# Patient Record
Sex: MALE | Race: WHITE | NOT HISPANIC OR LATINO | Employment: UNEMPLOYED | URBAN - METROPOLITAN AREA
[De-identification: names, ages, dates, MRNs, and addresses within clinical notes are randomized per-mention and may not be internally consistent; named-entity substitution may affect disease eponyms.]

---

## 2024-11-13 ENCOUNTER — OFFICE VISIT (OUTPATIENT)
Age: 39
End: 2024-11-13
Payer: MEDICAID

## 2024-11-13 VITALS
BODY MASS INDEX: 19.04 KG/M2 | SYSTOLIC BLOOD PRESSURE: 110 MMHG | WEIGHT: 133 LBS | DIASTOLIC BLOOD PRESSURE: 80 MMHG | HEART RATE: 63 BPM | HEIGHT: 70 IN | OXYGEN SATURATION: 97 %

## 2024-11-13 DIAGNOSIS — F41.1 GAD (GENERALIZED ANXIETY DISORDER): ICD-10-CM

## 2024-11-13 DIAGNOSIS — G47.00 INSOMNIA, UNSPECIFIED TYPE: ICD-10-CM

## 2024-11-13 DIAGNOSIS — F33.1 MAJOR DEPRESSIVE DISORDER, RECURRENT, MODERATE: Primary | ICD-10-CM

## 2024-11-13 PROCEDURE — 93000 ELECTROCARDIOGRAM COMPLETE: CPT | Performed by: NURSE PRACTITIONER

## 2024-11-13 PROCEDURE — 99204 OFFICE O/P NEW MOD 45 MIN: CPT | Performed by: NURSE PRACTITIONER

## 2024-11-13 RX ORDER — ESCITALOPRAM OXALATE 5 MG/1
5 TABLET ORAL DAILY
Qty: 14 TABLET | Refills: 0 | Status: CANCELLED | OUTPATIENT
Start: 2024-11-13

## 2024-11-13 RX ORDER — BUPROPION HYDROCHLORIDE 150 MG/1
1 TABLET ORAL DAILY
COMMUNITY
Start: 2024-08-28 | End: 2024-11-13 | Stop reason: SDUPTHER

## 2024-11-13 RX ORDER — ESCITALOPRAM OXALATE 10 MG/1
1 TABLET ORAL DAILY
COMMUNITY
Start: 2024-08-28 | End: 2024-11-13

## 2024-11-13 RX ORDER — METHADONE HYDROCHLORIDE 10 MG/1
65 TABLET ORAL DAILY
COMMUNITY

## 2024-11-13 RX ORDER — BUPROPION HYDROCHLORIDE 150 MG/1
150 TABLET ORAL DAILY
Qty: 90 TABLET | Refills: 0 | Status: SHIPPED | OUTPATIENT
Start: 2024-11-13

## 2024-11-13 RX ORDER — TRAZODONE HYDROCHLORIDE 50 MG/1
50 TABLET, FILM COATED ORAL NIGHTLY
COMMUNITY

## 2024-11-13 RX ORDER — TRAZODONE HYDROCHLORIDE 50 MG/1
50 TABLET, FILM COATED ORAL NIGHTLY
Qty: 30 TABLET | Refills: 0 | Status: CANCELLED | OUTPATIENT
Start: 2024-11-13

## 2024-11-13 NOTE — PROGRESS NOTES
"Chief Complaint  Med Refill    Subjective        Umair Hughes presents to [facility]  History of Present Illness    History of Present Illness  The patient presents for evaluation of medication refill.    He has exhausted his supply of medications. His methadone clinic advised him to undergo an EKG. He has been on Lexapro for approximately 4 to 5 months and methadone for about 3 to 4 weeks. He is considering discontinuing Lexapro, which he uses to manage his depression and anxiety. He has been without Lexapro for the past 2 days. He also takes trazodone for sleep, but not consistently every night. He has been on Wellbutrin 150 mg for about 8 months and reports no issues with this medication.    Results         Objective   Vital Signs:  /80   Pulse 63   Ht 177.8 cm (70\")   Wt 60.3 kg (133 lb)   SpO2 97%   BMI 19.08 kg/m²   Estimated body mass index is 19.08 kg/m² as calculated from the following:    Height as of this encounter: 177.8 cm (70\").    Weight as of this encounter: 60.3 kg (133 lb).    BMI is within normal parameters. No other follow-up for BMI required.      Physical Exam  Vitals reviewed.   Constitutional:       Appearance: Normal appearance.   HENT:      Nose: Nose normal.      Mouth/Throat:      Mouth: Mucous membranes are moist.      Pharynx: Oropharynx is clear.   Eyes:      Conjunctiva/sclera: Conjunctivae normal.   Cardiovascular:      Rate and Rhythm: Normal rate and regular rhythm.      Heart sounds: Normal heart sounds.   Pulmonary:      Effort: Pulmonary effort is normal.      Breath sounds: Normal breath sounds.   Musculoskeletal:         General: Normal range of motion.      Cervical back: Normal range of motion.   Skin:     General: Skin is warm.   Neurological:      Mental Status: He is alert and oriented to person, place, and time.   Psychiatric:         Mood and Affect: Mood normal.         Behavior: Behavior normal.         Thought Content: " Thought content normal.        Result Review :           ECG 12 Lead    Date/Time: 11/13/2024 1:45 PM  Performed by: Temitope Paiz APRN    Authorized by: Temitope Paiz APRN  Comparison: not compared with previous ECG   Previous ECG: no previous ECG available  Rhythm: sinus rhythm  Other findings: prolonged QTc interval              Assessment and Plan   Diagnoses and all orders for this visit:    1. Major depressive disorder, recurrent, moderate (Primary)  -     ECG 12 Lead  -     buPROPion XL (WELLBUTRIN XL) 150 MG 24 hr tablet; Take 1 tablet by mouth Daily.  Dispense: 90 tablet; Refill: 0    2. LISS (generalized anxiety disorder)  -     ECG 12 Lead  -     buPROPion XL (WELLBUTRIN XL) 150 MG 24 hr tablet; Take 1 tablet by mouth Daily.  Dispense: 90 tablet; Refill: 0    3. Insomnia, unspecified type        Assessment & Plan  1. Depression.  The patient has been on Lexapro for 4-5 months but reports not noticing any benefit. Due to potential heart arrhythmias when combined with methadone, the Lexapro dosage will be discontinued. An EKG has been ordered to monitor heart health. Alternative treatment options or referral to behavioral health will be considered if necessary.    2. Anxiety.  The patient has been using Lexapro for anxiety management.     3. Insomnia.  The patient uses trazodone to aid sleep but does not take it every night. He would like to discontinue trazodone.    4. Opioid Dependence.  The patient has been on methadone for 3-4 weeks. The interaction between methadone and Lexapro necessitates the discontinuation of Lexapro.    5. Medication Management.  The patient has been on Wellbutrin 150 mg for about 8 months and reports doing well on it. Continuation of Wellbutrin is recommended.         The following portions of the patient's history were reviewed and updated as appropriate: allergies, current medications, past family history, past medical history, past social history, past surgical history,  and problem list.       Follow Up   Return in about 2 weeks (around 11/27/2024) for Annual.  Patient was given instructions and counseling regarding his condition or for health maintenance advice. Please see specific information pulled into the AVS if appropriate.         Patient or patient representative verbalized consent for the use of Ambient Listening during the visit with  EDVIN Reid for chart documentation. 11/13/2024  14:37 EST

## 2024-12-26 ENCOUNTER — OFFICE VISIT (OUTPATIENT)
Age: 39
End: 2024-12-26
Payer: MEDICAID

## 2024-12-26 VITALS
OXYGEN SATURATION: 99 % | WEIGHT: 153.8 LBS | DIASTOLIC BLOOD PRESSURE: 62 MMHG | HEART RATE: 60 BPM | SYSTOLIC BLOOD PRESSURE: 108 MMHG | HEIGHT: 70 IN | BODY MASS INDEX: 22.02 KG/M2

## 2024-12-26 DIAGNOSIS — R94.31 PROLONGED Q-T INTERVAL ON ECG: ICD-10-CM

## 2024-12-26 DIAGNOSIS — Z00.00 ANNUAL PHYSICAL EXAM: Primary | ICD-10-CM

## 2024-12-26 DIAGNOSIS — Z11.59 ENCOUNTER FOR HEPATITIS C SCREENING TEST FOR LOW RISK PATIENT: ICD-10-CM

## 2024-12-26 DIAGNOSIS — K59.03 DRUG-INDUCED CONSTIPATION: ICD-10-CM

## 2024-12-26 PROCEDURE — 99396 PREV VISIT EST AGE 40-64: CPT | Performed by: NURSE PRACTITIONER

## 2024-12-26 PROCEDURE — 93000 ELECTROCARDIOGRAM COMPLETE: CPT | Performed by: NURSE PRACTITIONER

## 2024-12-26 NOTE — PROGRESS NOTES
"Chief Complaint  Annual Exam    Subjective        Umair Hughes presents to [facility]  History of Present Illness    History of Present Illness  The patient presents for a physical exam.    He has declined the tetanus vaccine, citing a recent administration within the past year. He has not sought ophthalmological care recently. He has not undergone an EKG since the last one was required for his medication management.    He reports experiencing mild constipation, which he attributes to his methadone use. He manages this with MiraLAX, finding it beneficial.    Supplemental Information  He had hepatitis C but got cured last year. He saw a specialist who told him he was 100% cured and they did blood work.    MEDICATIONS  methadone, MiraLAX    IMMUNIZATIONS  He declined the tetanus vaccine.    Results         Objective   Vital Signs:  /62 (BP Location: Right arm, Patient Position: Sitting, Cuff Size: Adult)   Pulse 60   Ht 177.5 cm (69.88\")   Wt 69.8 kg (153 lb 12.8 oz)   SpO2 99%   BMI 22.14 kg/m²   Estimated body mass index is 22.14 kg/m² as calculated from the following:    Height as of this encounter: 177.5 cm (69.88\").    Weight as of this encounter: 69.8 kg (153 lb 12.8 oz).    BMI is within normal parameters. No other follow-up for BMI required.      Physical Exam  Vitals and nursing note reviewed.   Constitutional:       Appearance: Normal appearance.   HENT:      Right Ear: Tympanic membrane, ear canal and external ear normal.      Left Ear: Tympanic membrane, ear canal and external ear normal.      Nose: Nose normal.      Mouth/Throat:      Mouth: Mucous membranes are moist.      Pharynx: Oropharynx is clear.   Eyes:      Conjunctiva/sclera: Conjunctivae normal.      Pupils: Pupils are equal, round, and reactive to light.   Cardiovascular:      Rate and Rhythm: Normal rate and regular rhythm.      Heart sounds: Normal heart sounds.   Pulmonary:      Effort: Pulmonary " effort is normal.      Breath sounds: Normal breath sounds.   Abdominal:      General: Bowel sounds are normal.      Palpations: Abdomen is soft.   Musculoskeletal:         General: Normal range of motion.      Cervical back: Normal range of motion and neck supple.   Skin:     General: Skin is warm.   Neurological:      Mental Status: He is alert and oriented to person, place, and time.   Psychiatric:         Mood and Affect: Mood normal.         Behavior: Behavior normal.         Thought Content: Thought content normal.        Result Review :           ECG 12 Lead    Date/Time: 12/26/2024 9:28 AM  Performed by: Rober Castro MA    Authorized by: Temitope Paiz APRN  Comparison: compared with previous ECG from 11/12/2024  Similar to previous ECG  Rhythm: sinus bradycardia    Clinical impression: normal ECG       Pt counseled on vaccination. Discussed routine labs. Advised pt on need for vision exam.        Assessment and Plan   Diagnoses and all orders for this visit:    1. Annual physical exam (Primary)  -     Comprehensive Metabolic Panel; Future  -     CBC (No Diff); Future  -     Lipid Panel; Future  -     TSH Rfx On Abnormal To Free T4; Future    2. Prolonged Q-T interval on ECG  -     ECG 12 Lead  -     TSH Rfx On Abnormal To Free T4; Future    3. Encounter for hepatitis C screening test for low risk patient  -     Hepatitis C Antibody; Future    4. Drug-induced constipation        Assessment & Plan  1. Health maintenance.  Routine laboratory tests have been ordered, including a Hepatitis C screening. He has declined the tetanus vaccine.    2. Constipation.  He is advised to continue taking MiraLAX once daily to manage constipation associated with methadone use.    3. Cerumen impaction.  Significant ear wax was noted in one ear. He declined to have it removed during this visit.    Follow-up  The patient will follow up in 1 year.       The following portions of the patient's history were reviewed and  updated as appropriate: allergies, current medications, past family history, past medical history, past social history, past surgical history, and problem list.       Follow Up   Return in about 1 year (around 12/26/2025) for Annual.  Patient was given instructions and counseling regarding his condition or for health maintenance advice. Please see specific information pulled into the AVS if appropriate.         Patient or patient representative verbalized consent for the use of Ambient Listening during the visit with  EDVIN Reid for chart documentation. 12/26/2024  09:46 EST